# Patient Record
(demographics unavailable — no encounter records)

---

## 2024-10-10 NOTE — ASSESSMENT
[FreeTextEntry1] : right knee pain for a while but worse since mid september 2024.  no injury.  medial and anterior knee pain today.   minimal oa.  psoriatic arthritis.  htn.  retired banker regular exercise.  10/10/2024: He sees Dr. Plaza and here to review MRI. He has no pain currently, but when pain occurs it is on medial side. Underlying pathology reviewed and treatment options discussed. Activity modification as tolerated. Consider surgical options if pain worsens. Questions addressed. Follow up with Dr. Plaza.  The documentation recorded by the scribe accurately reflects the service I personally performed and the decisions made by me. I, Fay Skelton, attest that this documentation has been prepared under the direction and in the presence of Provider Jose Pinon MD.   The patient was seen by Jose Pinon MD.

## 2024-10-10 NOTE — HISTORY OF PRESENT ILLNESS
[Retired] : Work status: retired [de-identified] : 10/10/2024: Here to review MRI.  09/24/24 - pt is here for right knee pain, pt states pain began a week ago, states comes and goes. has some stiffness in kneecap / pain is lateral / states a deep joint pain. no numbness or tingling. pain is localized in knee cap.  [] : Post Surgical Visit: no

## 2024-10-10 NOTE — PHYSICAL EXAM
[Right] : right knee [NL (0)] : extension 0 degrees [4___] : hamstring 4[unfilled]/5 [Positive] : positive Zain [] : no erythema [TWNoteComboBox7] : flexion 120 degrees